# Patient Record
Sex: MALE | Race: WHITE | NOT HISPANIC OR LATINO | Employment: FULL TIME | URBAN - METROPOLITAN AREA
[De-identification: names, ages, dates, MRNs, and addresses within clinical notes are randomized per-mention and may not be internally consistent; named-entity substitution may affect disease eponyms.]

---

## 2017-09-19 ENCOUNTER — TELEPHONE (OUTPATIENT)
Dept: OPHTHALMOLOGY | Facility: CLINIC | Age: 42
End: 2017-09-19

## 2017-09-19 NOTE — TELEPHONE ENCOUNTER
----- Message from Laxmi Barr sent at 9/19/2017  3:19 PM CDT -----  Contact: pt  Would like to speak with staff pt states his eyeglass rx that he was given is wrong pt states he is not able to see anything out of left with glasses pt states he was told by dr ortega when eye exam was done that his prescription was fine no changes but when he went to get glasses left eye had change   pls call advise [phone]

## 2017-10-16 ENCOUNTER — OFFICE VISIT (OUTPATIENT)
Dept: OPHTHALMOLOGY | Facility: CLINIC | Age: 42
End: 2017-10-16
Payer: COMMERCIAL

## 2017-10-16 DIAGNOSIS — H52.223 REGULAR ASTIGMATISM OF BOTH EYES: Primary | ICD-10-CM

## 2017-10-16 PROCEDURE — 99999 PR PBB SHADOW E&M-EST. PATIENT-LVL II: CPT | Mod: PBBFAC,,, | Performed by: OPTOMETRIST

## 2017-10-16 PROCEDURE — 99499 UNLISTED E&M SERVICE: CPT | Mod: S$GLB,,, | Performed by: OPTOMETRIST

## 2017-10-16 RX ORDER — LISDEXAMFETAMINE DIMESYLATE 60 MG/1
CAPSULE ORAL
COMMUNITY
Start: 2017-09-11 | End: 2021-05-11 | Stop reason: SDUPTHER

## 2017-10-16 NOTE — PROGRESS NOTES
HPI     Eye Problem    Additional comments: RX made x 1 month ago and unable to wear glasses,   returned to [facility]           Comments   RX made x 1 month ago and unable to wear glasses, returned to [facility] (RX   12-16) Vision was blurred in left eye.  Patient didn't take the glasses   home.  Patient wearing old Rx.  Very little change was found at the last exam.   at [facility] said   that the cylinder axis in the new prescription was different from the old   prescription.       Last edited by Jacki Agudelo, OD on 10/16/2017  3:15 PM. (History)            Assessment /Plan     For exam results, see Encounter Report.    Regular astigmatism of both eyes      Patient preferred the new prescription in the phoropter when compared to the old.  The axes are the same; the only change in the prescription was the power of the cylinder.    Patient advised to have the prescription filled and return to clinic if any vision complaints.

## 2021-02-03 PROBLEM — G47.00 INSOMNIA: Status: ACTIVE | Noted: 2021-02-03

## 2021-02-03 PROBLEM — F90.9 ADHD (ATTENTION DEFICIT HYPERACTIVITY DISORDER): Status: ACTIVE | Noted: 2021-02-03

## 2022-10-20 PROBLEM — I73.00 RAYNAUD'S PHENOMENON WITHOUT GANGRENE: Status: ACTIVE | Noted: 2022-10-20

## 2022-11-14 PROBLEM — K21.9 GASTROESOPHAGEAL REFLUX DISEASE: Status: ACTIVE | Noted: 2022-11-14

## 2022-12-12 PROBLEM — K80.10 CCC (CHRONIC CALCULOUS CHOLECYSTITIS): Status: ACTIVE | Noted: 2022-12-12

## 2023-06-30 PROBLEM — Z98.890 PONV (POSTOPERATIVE NAUSEA AND VOMITING): Status: ACTIVE | Noted: 2023-06-30

## 2023-06-30 PROBLEM — F41.9 ANXIETY: Status: ACTIVE | Noted: 2023-06-30

## 2023-06-30 PROBLEM — R11.2 PONV (POSTOPERATIVE NAUSEA AND VOMITING): Status: ACTIVE | Noted: 2023-06-30

## 2023-06-30 PROBLEM — R55 SYNCOPAL EPISODES: Status: ACTIVE | Noted: 2023-06-30

## 2024-03-18 PROBLEM — Z80.42 FH: PROSTATE CANCER: Status: ACTIVE | Noted: 2024-03-18
